# Patient Record
Sex: FEMALE | URBAN - NONMETROPOLITAN AREA
[De-identification: names, ages, dates, MRNs, and addresses within clinical notes are randomized per-mention and may not be internally consistent; named-entity substitution may affect disease eponyms.]

---

## 2019-02-05 ENCOUNTER — APPOINTMENT (RX ONLY)
Dept: URBAN - NONMETROPOLITAN AREA CLINIC 6 | Facility: CLINIC | Age: 69
Setting detail: DERMATOLOGY
End: 2019-02-05

## 2019-02-05 DIAGNOSIS — L30.9 DERMATITIS, UNSPECIFIED: ICD-10-CM

## 2019-02-05 PROCEDURE — ? TREATMENT REGIMEN

## 2019-02-05 PROCEDURE — ? KOH PREP

## 2019-02-05 PROCEDURE — 99201: CPT

## 2019-02-05 PROCEDURE — ? COUNSELING

## 2019-02-05 PROCEDURE — ? PRESCRIPTION

## 2019-02-05 RX ORDER — BETAMETHASONE DIPROPIONATE 0.5 MG/G
APPLY LIGHTLY TO HANDS OINTMENT TOPICAL TWICE A DAY
Qty: 1 | Refills: 3 | Status: ERX | COMMUNITY
Start: 2019-02-05

## 2019-02-05 RX ADMIN — BETAMETHASONE DIPROPIONATE APPLY LIGHTLY TO HANDS: 0.5 OINTMENT TOPICAL at 00:00

## 2019-02-05 ASSESSMENT — LOCATION DETAILED DESCRIPTION DERM
LOCATION DETAILED: RIGHT DISTAL RADIAL THUMB
LOCATION DETAILED: RIGHT DISTAL ULNAR PALMAR RING FINGER
LOCATION DETAILED: RIGHT PROXIMAL DORSAL INDEX FINGER
LOCATION DETAILED: RIGHT DISTAL ULNAR PALMAR SMALL FINGER
LOCATION DETAILED: RIGHT PROXIMAL PALMAR MIDDLE FINGER
LOCATION DETAILED: RIGHT PROXIMAL PALMAR INDEX FINGER
LOCATION DETAILED: RIGHT PROXIMAL ULNAR PALMAR INDEX FINGER

## 2019-02-05 ASSESSMENT — LOCATION SIMPLE DESCRIPTION DERM
LOCATION SIMPLE: RIGHT MIDDLE FINGER
LOCATION SIMPLE: RIGHT RING FINGER
LOCATION SIMPLE: RIGHT SMALL FINGER
LOCATION SIMPLE: RIGHT THUMB
LOCATION SIMPLE: RIGHT INDEX FINGER

## 2019-02-05 ASSESSMENT — LOCATION ZONE DERM: LOCATION ZONE: FINGER

## 2019-02-05 NOTE — HPI: RASH
What Type Of Note Output Would You Prefer (Optional)?: Standard Output
How Severe Is Your Rash?: moderate
Is This A New Presentation, Or A Follow-Up?: Rash
Additional History: Washing hands with Equate, soap in shower is soft soap. Laundry detergent is [employer], no dryer sheets in dryer. Shampoo is Melaleuca and all her cleaning supplies are Melaleuca. Has had blood work up and all was normal. Hobbies are gardening and needle work.\\nShe saw Dr. Purcell who performed biopsy and culture.  Biopsy showed ulceration and culture did not grow acid fast bacilli.\\nShe also feels that she can remove a white cotton like substance from under the skin of involved areas on her hands.  She is concerned that she has sporotrichosis and could have it in her system.  She does work outside in her gardens when the weather is nice, but has not for quite some time.

## 2019-02-05 NOTE — PROCEDURE: KOH PREP
Detail Level: Simple
Koh Intro Text (From The.....): A KOH prep was ordered and evaluated from the
Showing: fungal hyphal elements: negative
Koh Procedure Text (Tissue Harvesting Technique): A 15-blade scalpel was used to scrape the skin. The skin scrapings were placed on a glass slide, covered with a coverslip and a KOH solution was applied.

## 2019-02-05 NOTE — PROCEDURE: TREATMENT REGIMEN
Otc Regimen: Use all fragrance free products, samples given of Cetaphil cream, free and clear shampoo and conditioner, fragrance free laundry detergent. Sensitive skin care sheet given.
Initiate Treatment: Betamethasone .05% ointment to apply twice a day for two weeks, off one week, then use one week on, one week off prn. At night will apply her steroid ointment and moisturizer and will use white cotton glove liners over top.
Detail Level: Simple

## 2019-03-06 ENCOUNTER — APPOINTMENT (RX ONLY)
Dept: URBAN - NONMETROPOLITAN AREA CLINIC 6 | Facility: CLINIC | Age: 69
Setting detail: DERMATOLOGY
End: 2019-03-06

## 2019-03-06 DIAGNOSIS — L30.9 DERMATITIS, UNSPECIFIED: ICD-10-CM

## 2019-03-06 DIAGNOSIS — L24 IRRITANT CONTACT DERMATITIS: ICD-10-CM

## 2019-03-06 PROBLEM — L24.9 IRRITANT CONTACT DERMATITIS, UNSPECIFIED CAUSE: Status: ACTIVE | Noted: 2019-03-06

## 2019-03-06 PROBLEM — M12.9 ARTHROPATHY, UNSPECIFIED: Status: ACTIVE | Noted: 2019-03-06

## 2019-03-06 PROCEDURE — ? COUNSELING

## 2019-03-06 PROCEDURE — ? TREATMENT REGIMEN

## 2019-03-06 PROCEDURE — 99213 OFFICE O/P EST LOW 20 MIN: CPT

## 2019-03-06 ASSESSMENT — LOCATION ZONE DERM
LOCATION ZONE: HAND
LOCATION ZONE: TRUNK

## 2019-03-06 ASSESSMENT — LOCATION DETAILED DESCRIPTION DERM
LOCATION DETAILED: RIGHT ULNAR DORSAL HAND
LOCATION DETAILED: LEFT ULNAR PALM
LOCATION DETAILED: SUPERIOR THORACIC SPINE
LOCATION DETAILED: LEFT RADIAL DORSAL HAND
LOCATION DETAILED: RIGHT RADIAL PALM

## 2019-03-06 ASSESSMENT — LOCATION SIMPLE DESCRIPTION DERM
LOCATION SIMPLE: UPPER BACK
LOCATION SIMPLE: LEFT HAND
LOCATION SIMPLE: RIGHT HAND

## 2019-03-06 NOTE — PROCEDURE: TREATMENT REGIMEN
Otc Regimen: Continue white cotton gloves to protect her hands from irritants
Initiate Treatment: The Betamethasone.05% Cream which she is using on her hands, will apply lightly to affected areas on her back BID for 1 week, 1 week off, on and off prn.
Continue Regimen: All fragrance free products, Betamethasone only if gets red and itchy. Continue with good moisturizer.
Detail Level: Simple
Detail Level: Zone